# Patient Record
Sex: FEMALE | Race: WHITE | Employment: FULL TIME | URBAN - METROPOLITAN AREA
[De-identification: names, ages, dates, MRNs, and addresses within clinical notes are randomized per-mention and may not be internally consistent; named-entity substitution may affect disease eponyms.]

---

## 2023-12-15 ENCOUNTER — HOSPITAL ENCOUNTER (EMERGENCY)
Age: 23
Discharge: HOME | End: 2023-12-15
Attending: EMERGENCY MEDICINE
Payer: MEDICAID

## 2023-12-15 VITALS
RESPIRATION RATE: 18 BRPM | TEMPERATURE: 97.9 F | HEART RATE: 68 BPM | DIASTOLIC BLOOD PRESSURE: 77 MMHG | OXYGEN SATURATION: 99 % | SYSTOLIC BLOOD PRESSURE: 129 MMHG

## 2023-12-15 DIAGNOSIS — Z34.91 FIRST TRIMESTER PREGNANCY: ICD-10-CM

## 2023-12-15 DIAGNOSIS — R10.30 LOWER ABDOMINAL PAIN: Primary | ICD-10-CM

## 2023-12-15 PROCEDURE — 99282 EMERGENCY DEPT VISIT SF MDM: CPT

## 2023-12-15 ASSESSMENT — LIFESTYLE VARIABLES
HOW MANY STANDARD DRINKS CONTAINING ALCOHOL DO YOU HAVE ON A TYPICAL DAY: PATIENT DOES NOT DRINK
HOW OFTEN DO YOU HAVE A DRINK CONTAINING ALCOHOL: NEVER

## 2023-12-15 ASSESSMENT — PAIN SCALES - GENERAL: PAINLEVEL_OUTOF10: 3

## 2023-12-15 NOTE — ED TRIAGE NOTES
Pt arrives to the ER with c/o abdominal pain after an injury at work today. Pt states finding out they were pregnant x 1 week ago. Pt states 3/10 pain.

## 2023-12-15 NOTE — ED PROVIDER NOTES
of Problems Addressed:  1 or more acute illnesses that pose a threat to life or bodily function. Data Reviewed and Analyzed:    Category 1:     Category 2:     Category 3: Discussion of management or test interpretation. See MDM / clinical course section above for details    Risk of Complications and/or Morbidity of Patient Management:  Shared medical decision making was utilized in creating the patients health plan today. No orders of the defined types were placed in this encounter. ED Meds Given:  Medications - No data to display  New Prescriptions    No medications on file         ___________________  HPI: Tori Obando is a 21 y.o. female  with no previous complications associated with pregnancy presenting for evaluation of lower abdominal pain symptoms in the context of minor abdominal trauma. Patient essentially works at [employer] and was moving boxes quickly when she ran into some shelving with the box impacting the shelving in the box subsequently impacting her abdomen. This occurred at roughly 1 PM.  Patient had some intermittent lower abdominal cramping symptoms without bleeding. Last cramping symptoms occurred around 3 PM and has had no additional pain symptoms. Patient denies vaginal bleeding, leakage of fluids, fever/chills, nausea vomiting or diarrheal symptoms. Patient unsure of exactly how far along she is in pregnancy, last menstrual cycle estimated to be at the end of October per her report. Patient has follow-up with OB/GYN care provider [facility] on . Patient/family denies any other evaluation for today's acute complaint. Patient/family denies any other aggravating or alleviating factors. Patient/family denies any other symptoms. ROS:   All review of systems negative except as noted above in the history of the present illness. Past Medical/ Family/ Social History:     Medical history: No past medical history on file.   Surgical history: No

## 2023-12-15 NOTE — DISCHARGE SUMMARY
Patient called for discharge and AVS. Called 3x no answer. Provider and charge RN notified. Unable to complete final vital signs or RN assessment.

## 2023-12-15 NOTE — DISCHARGE INSTRUCTIONS
Return as needed for questions, concerns or worsening symptoms, particularly increasing / unremitting cramping/pain, vaginal bleeding, leakage of fluids, fever/chills, recurrent vomiting.